# Patient Record
Sex: MALE | URBAN - METROPOLITAN AREA
[De-identification: names, ages, dates, MRNs, and addresses within clinical notes are randomized per-mention and may not be internally consistent; named-entity substitution may affect disease eponyms.]

---

## 2020-06-30 RX ORDER — POVIDONE-IODINE 5 %
1 AEROSOL (ML) TOPICAL ONCE
Refills: 0 | Status: COMPLETED | OUTPATIENT
Start: 2020-07-01 | End: 2020-07-01

## 2020-06-30 NOTE — H&P ADULT - PROBLEM SELECTOR PLAN 1
Admit to Orthopaedic Service.  Presents today for elective left UKR, robotic assisted   Pt medically stable and cleared for procedure today by  Admit to Orthopaedic Service.  Presents today for elective left UKR, robotic assisted   Pt medically stable and cleared for procedure today by Dr. Montez

## 2020-06-30 NOTE — H&P ADULT - HISTORY OF PRESENT ILLNESS
75M c/o left knee pain x     Present for elective unicondylar knee arthroplasty, robotic assisted 75M c/o bilateral knee pain x chronic. Pt denies preceding trauma/injury. Pt states he has hx of 2 left knee arthroscopies and 1 right arthroscopy. Pt states his knee pain is localized and medial. Left knee pain > right. Pt denies numbness/tingling/weakness of bilateral lower extremities. He does not ambulate with an assistive device at baseline. Pt denies DVT hx; denies CP, SOB, N/V, tactile fevers, sick contacts.     Present for bilateral elective unicondylar knee arthroplasty, robotic assisted

## 2020-06-30 NOTE — H&P ADULT - NSICDXPASTSURGICALHX_GEN_ALL_CORE_FT
PAST SURGICAL HISTORY:  Carpal tunnel syndrome both wrists    H/O arthroscopy of left knee x 3    H/O arthroscopy of right knee x 1    H/O right inguinal hernia repair

## 2020-06-30 NOTE — ASU PATIENT PROFILE, ADULT - PSH
Carpal tunnel syndrome  both wrists  H/O arthroscopy of left knee  x 3  H/O arthroscopy of right knee  x 1  H/O right inguinal hernia repair

## 2020-06-30 NOTE — H&P ADULT - NSHPLABSRESULTS_GEN_ALL_CORE
3M DOS  Covid Swab Pending 3M DOS  Covid Swab negative within 72h  Preop CBC, BMP, PT/PTT/INR, UA - WNL per medical clearance   Preop EKG - sinus bradycardia -  WNL per medical clearance

## 2020-06-30 NOTE — H&P ADULT - NSHPPHYSICALEXAM_GEN_ALL_CORE
MSK: + decreased ROM 2/2 pain, left knee       Remainder of exam per medical clearance note MSK: + decreased ROM 2/2 pain, left knee   Skin warm and well perfused, no visible wounds/erythema/ecchymoses  EHL/FHL/TA/GS 5/5 motor strength bilaterally   SLT in tact to distal bilateral lower extremities   DP pulses 2+    Remainder of exam per medical clearance note

## 2020-07-01 ENCOUNTER — OUTPATIENT (OUTPATIENT)
Dept: OUTPATIENT SERVICES | Facility: HOSPITAL | Age: 75
LOS: 1 days | Discharge: HOME | End: 2020-07-01
Payer: MEDICARE

## 2020-07-01 VITALS
RESPIRATION RATE: 20 BRPM | OXYGEN SATURATION: 98 % | DIASTOLIC BLOOD PRESSURE: 70 MMHG | SYSTOLIC BLOOD PRESSURE: 125 MMHG | HEART RATE: 80 BPM

## 2020-07-01 VITALS
HEART RATE: 63 BPM | RESPIRATION RATE: 18 BRPM | TEMPERATURE: 98 F | SYSTOLIC BLOOD PRESSURE: 143 MMHG | WEIGHT: 178.57 LBS | DIASTOLIC BLOOD PRESSURE: 80 MMHG | HEIGHT: 67 IN | OXYGEN SATURATION: 98 %

## 2020-07-01 DIAGNOSIS — I10 ESSENTIAL (PRIMARY) HYPERTENSION: ICD-10-CM

## 2020-07-01 DIAGNOSIS — G56.00 CARPAL TUNNEL SYNDROME, UNSPECIFIED UPPER LIMB: Chronic | ICD-10-CM

## 2020-07-01 DIAGNOSIS — M19.90 UNSPECIFIED OSTEOARTHRITIS, UNSPECIFIED SITE: ICD-10-CM

## 2020-07-01 DIAGNOSIS — Z98.890 OTHER SPECIFIED POSTPROCEDURAL STATES: Chronic | ICD-10-CM

## 2020-07-01 PROCEDURE — 73560 X-RAY EXAM OF KNEE 1 OR 2: CPT | Mod: 26,50

## 2020-07-01 PROCEDURE — 86901 BLOOD TYPING SEROLOGIC RH(D): CPT

## 2020-07-01 PROCEDURE — C1776: CPT

## 2020-07-01 PROCEDURE — 97161 PT EVAL LOW COMPLEX 20 MIN: CPT

## 2020-07-01 PROCEDURE — C1713: CPT

## 2020-07-01 PROCEDURE — 73560 X-RAY EXAM OF KNEE 1 OR 2: CPT

## 2020-07-01 PROCEDURE — 27437 REVISE KNEECAP: CPT | Mod: XS,50

## 2020-07-01 PROCEDURE — S2900: CPT

## 2020-07-01 PROCEDURE — 86850 RBC ANTIBODY SCREEN: CPT

## 2020-07-01 PROCEDURE — 27446 REVISION OF KNEE JOINT: CPT | Mod: 50

## 2020-07-01 RX ORDER — HYDROMORPHONE HYDROCHLORIDE 2 MG/ML
0.5 INJECTION INTRAMUSCULAR; INTRAVENOUS; SUBCUTANEOUS
Refills: 0 | Status: DISCONTINUED | OUTPATIENT
Start: 2020-07-01 | End: 2020-07-01

## 2020-07-01 RX ORDER — OXYCODONE AND ACETAMINOPHEN 5; 325 MG/1; MG/1
2 TABLET ORAL EVERY 6 HOURS
Refills: 0 | Status: DISCONTINUED | OUTPATIENT
Start: 2020-07-01 | End: 2020-07-01

## 2020-07-01 RX ORDER — SODIUM CHLORIDE 9 MG/ML
1000 INJECTION, SOLUTION INTRAVENOUS
Refills: 0 | Status: DISCONTINUED | OUTPATIENT
Start: 2020-07-01 | End: 2020-07-01

## 2020-07-01 RX ORDER — ACETAMINOPHEN 500 MG
650 TABLET ORAL EVERY 6 HOURS
Refills: 0 | Status: DISCONTINUED | OUTPATIENT
Start: 2020-07-01 | End: 2020-07-01

## 2020-07-01 RX ORDER — CHLORHEXIDINE GLUCONATE 213 G/1000ML
1 SOLUTION TOPICAL ONCE
Refills: 0 | Status: COMPLETED | OUTPATIENT
Start: 2020-07-01 | End: 2020-07-01

## 2020-07-01 RX ORDER — OXYCODONE AND ACETAMINOPHEN 5; 325 MG/1; MG/1
1 TABLET ORAL EVERY 6 HOURS
Refills: 0 | Status: DISCONTINUED | OUTPATIENT
Start: 2020-07-01 | End: 2020-07-01

## 2020-07-01 RX ORDER — ONDANSETRON 8 MG/1
4 TABLET, FILM COATED ORAL ONCE
Refills: 0 | Status: DISCONTINUED | OUTPATIENT
Start: 2020-07-01 | End: 2020-07-01

## 2020-07-01 RX ADMIN — Medication 1 APPLICATION(S): at 07:03

## 2020-07-01 RX ADMIN — CHLORHEXIDINE GLUCONATE 1 APPLICATION(S): 213 SOLUTION TOPICAL at 07:03

## 2020-07-01 NOTE — PHYSICAL THERAPY INITIAL EVALUATION ADULT - ACTIVE RANGE OF MOTION EXAMINATION, REHAB EVAL
bilateral upper extremity Active ROM was WFL (within functional limits)/knee limited to 100 degrees flexion

## 2020-07-01 NOTE — PROGRESS NOTE ADULT - SUBJECTIVE AND OBJECTIVE BOX
Ortho Post Op Check    Procedure: B/L UKR  Surgeon: Dr. Hairston    Pt comfortable without complaints, pain controlled  Denies CP, SOB, N/V, numbness/tingling     Vital Signs Last 24 Hrs  T(C): 36.2 (07-01-20 @ 13:57), Max: 36.2 (07-01-20 @ 13:57)  T(F): 97.2 (07-01-20 @ 13:57), Max: 97.2 (07-01-20 @ 13:57)  HR: 83 (07-01-20 @ 15:07) (83 - 88)  BP: 127/69 (07-01-20 @ 15:07) (111/78 - 127/69)  BP(mean): 91 (07-01-20 @ 15:07) (84 - 91)  RR: 17 (07-01-20 @ 15:07) (17 - 21)  SpO2: 98% (07-01-20 @ 15:07) (94% - 100%)    General: Pt Alert and oriented, NAD  DSG C/D/I b/l knee  Pulses intact b/l LE  Sensation intact b/l LE  Motor: EHL/FHL/TA/GS 5/5 b/l    Post-op X-Ray: prostheses in place b/l    A/P: 75yMale POD#0 s/p b/l UKR  - Stable  - Pain Control  - DVT ppx: asa  - PT, WBS: wbat  - d/c home today if clears PT    Ortho Pager [phone]

## 2020-07-01 NOTE — PHYSICAL THERAPY INITIAL EVALUATION ADULT - GENERAL OBSERVATIONS, REHAB EVAL
Patient received in semi-wolfe position, no apparent distress, +telemetry, +hep lock, +cryocuff, +sequential pneumatic compression device.

## 2020-07-01 NOTE — PHYSICAL THERAPY INITIAL EVALUATION ADULT - IMPAIRED TRANSFERS: SIT/STAND, REHAB EVAL
impaired balance/impaired motor control/pain/impaired postural control/decreased strength/decreased ROM

## 2020-07-01 NOTE — PHYSICAL THERAPY INITIAL EVALUATION ADULT - ADDITIONAL COMMENTS
Pt lives at home with an office on 2nd floor, no prior use of DME, denies hx of falls, independent PTA.

## 2020-07-01 NOTE — PHYSICAL THERAPY INITIAL EVALUATION ADULT - IMPAIRMENTS CONTRIBUTING TO GAIT DEVIATIONS, PT EVAL
decreased strength/decreased ROM/impaired balance/impaired postural control/impaired motor control/pain

## 2024-01-05 NOTE — ASU PATIENT PROFILE, ADULT - ANESTHESIA, PREVIOUS REACTION, PROFILE
Quality 431: Preventive Care And Screening: Unhealthy Alcohol Use - Screening: Patient not identified as an unhealthy alcohol user when screened for unhealthy alcohol use using a systematic screening method Quality 110: Preventive Care And Screening: Influenza Immunization: Influenza immunization was not ordered or administered, reason not given Quality 226: Preventive Care And Screening: Tobacco Use: Screening And Cessation Intervention: Patient screened for tobacco use and is an ex/non-smoker Quality 130: Documentation Of Current Medications In The Medical Record: Current Medications Documented none Detail Level: Detailed